# Patient Record
Sex: FEMALE | Race: WHITE | NOT HISPANIC OR LATINO
[De-identification: names, ages, dates, MRNs, and addresses within clinical notes are randomized per-mention and may not be internally consistent; named-entity substitution may affect disease eponyms.]

---

## 2020-11-13 PROBLEM — Z00.00 ENCOUNTER FOR PREVENTIVE HEALTH EXAMINATION: Status: ACTIVE | Noted: 2020-11-13

## 2020-11-16 ENCOUNTER — NON-APPOINTMENT (OUTPATIENT)
Age: 70
End: 2020-11-16

## 2020-11-16 ENCOUNTER — APPOINTMENT (OUTPATIENT)
Dept: OPHTHALMOLOGY | Facility: CLINIC | Age: 70
End: 2020-11-16
Payer: MEDICARE

## 2020-11-16 PROCEDURE — 92002 INTRM OPH EXAM NEW PATIENT: CPT

## 2020-11-16 PROCEDURE — 76513 OPH US DX ANT SGM US UNI/BI: CPT | Mod: LT

## 2021-06-02 ENCOUNTER — APPOINTMENT (OUTPATIENT)
Dept: OPHTHALMOLOGY | Facility: CLINIC | Age: 71
End: 2021-06-02

## 2023-04-05 NOTE — ASU PATIENT PROFILE, ADULT - NS PREOP UNDERSTANDS INFO
no solids after 10pm, clears allowed up to 4am, bring ID and insurance card, no valuables or jewelry, wear comfortable clothing/yes

## 2023-04-05 NOTE — ASU PATIENT PROFILE, ADULT - NSICDXPASTMEDICALHX_GEN_ALL_CORE_FT
PAST MEDICAL HISTORY:  2019 novel coronavirus disease (COVID-19) 12/22    Anxiety and depression     Breast CA left    H/O osteopenia     H/O spinal stenosis cervical    Herpes

## 2023-04-05 NOTE — ASU PATIENT PROFILE, ADULT - NSICDXPASTSURGICALHX_GEN_ALL_CORE_FT
PAST SURGICAL HISTORY:  H/O cervical spine surgery     S/P lumpectomy, left breast     Status post right partial knee replacement

## 2023-04-06 ENCOUNTER — OUTPATIENT (OUTPATIENT)
Dept: OUTPATIENT SERVICES | Facility: HOSPITAL | Age: 73
LOS: 1 days | Discharge: HOME | End: 2023-04-06

## 2023-04-06 VITALS
RESPIRATION RATE: 14 BRPM | HEART RATE: 64 BPM | DIASTOLIC BLOOD PRESSURE: 89 MMHG | WEIGHT: 125 LBS | SYSTOLIC BLOOD PRESSURE: 140 MMHG | TEMPERATURE: 99 F | HEIGHT: 64.75 IN | OXYGEN SATURATION: 97 %

## 2023-04-06 VITALS
OXYGEN SATURATION: 97 % | RESPIRATION RATE: 14 BRPM | DIASTOLIC BLOOD PRESSURE: 93 MMHG | HEART RATE: 70 BPM | SYSTOLIC BLOOD PRESSURE: 142 MMHG

## 2023-04-06 DIAGNOSIS — Z96.651 PRESENCE OF RIGHT ARTIFICIAL KNEE JOINT: Chronic | ICD-10-CM

## 2023-04-06 DIAGNOSIS — Z98.890 OTHER SPECIFIED POSTPROCEDURAL STATES: Chronic | ICD-10-CM

## 2023-04-06 DEVICE — IMPLANTABLE DEVICE
Type: IMPLANTABLE DEVICE | Site: RIGHT | Status: NON-FUNCTIONAL
Removed: 2023-04-06

## 2023-04-06 RX ORDER — SODIUM CHLORIDE 9 MG/ML
1000 INJECTION, SOLUTION INTRAVENOUS
Refills: 0 | Status: DISCONTINUED | OUTPATIENT
Start: 2023-04-06 | End: 2023-04-06

## 2023-04-06 RX ORDER — RALOXIFENE HYDROCHLORIDE 60 MG/1
1 TABLET, COATED ORAL
Refills: 0 | DISCHARGE

## 2023-04-06 RX ORDER — VALACYCLOVIR 500 MG/1
1 TABLET, FILM COATED ORAL
Refills: 0 | DISCHARGE

## 2023-04-06 RX ORDER — GABAPENTIN 400 MG/1
0 CAPSULE ORAL
Refills: 0 | DISCHARGE

## 2023-04-06 RX ORDER — ACETAMINOPHEN 500 MG
650 TABLET ORAL ONCE
Refills: 0 | Status: DISCONTINUED | OUTPATIENT
Start: 2023-04-06 | End: 2023-04-06

## 2023-04-06 RX ORDER — DESLORATADINE 5 MG/1
1 TABLET, FILM COATED ORAL
Refills: 0 | DISCHARGE

## 2023-04-06 RX ORDER — BUPROPION HYDROCHLORIDE 150 MG/1
1 TABLET, EXTENDED RELEASE ORAL
Refills: 0 | DISCHARGE

## 2023-04-06 RX ORDER — TROPICAMIDE 1 %
1 DROPS OPHTHALMIC (EYE)
Refills: 0 | Status: COMPLETED | OUTPATIENT
Start: 2023-04-06 | End: 2023-04-06

## 2023-04-06 RX ORDER — KETOROLAC TROMETHAMINE 0.5 %
1 DROPS OPHTHALMIC (EYE)
Refills: 0 | Status: COMPLETED | OUTPATIENT
Start: 2023-04-06 | End: 2023-04-06

## 2023-04-06 RX ORDER — FINASTERIDE 5 MG/1
1 TABLET, FILM COATED ORAL
Refills: 0 | DISCHARGE

## 2023-04-06 RX ORDER — PHENYLEPHRINE HCL 2.5 %
1 DROPS OPHTHALMIC (EYE)
Refills: 0 | Status: COMPLETED | OUTPATIENT
Start: 2023-04-06 | End: 2023-04-06

## 2023-04-06 RX ORDER — CYCLOPENTOLATE HYDROCHLORIDE 10 MG/ML
1 SOLUTION/ DROPS OPHTHALMIC
Refills: 0 | Status: COMPLETED | OUTPATIENT
Start: 2023-04-06 | End: 2023-04-06

## 2023-04-06 RX ORDER — OFLOXACIN 0.3 %
1 DROPS OPHTHALMIC (EYE)
Refills: 0 | Status: COMPLETED | OUTPATIENT
Start: 2023-04-06 | End: 2023-04-06

## 2023-04-06 RX ADMIN — CYCLOPENTOLATE HYDROCHLORIDE 1 DROP(S): 10 SOLUTION/ DROPS OPHTHALMIC at 06:50

## 2023-04-06 RX ADMIN — Medication 1 DROP(S): at 06:50

## 2023-04-06 RX ADMIN — Medication 1 DROP(S): at 06:40

## 2023-04-06 RX ADMIN — CYCLOPENTOLATE HYDROCHLORIDE 1 DROP(S): 10 SOLUTION/ DROPS OPHTHALMIC at 06:40

## 2023-04-06 RX ADMIN — CYCLOPENTOLATE HYDROCHLORIDE 1 DROP(S): 10 SOLUTION/ DROPS OPHTHALMIC at 06:45

## 2023-04-06 RX ADMIN — Medication 1 DROP(S): at 06:45

## 2023-04-06 NOTE — ASU PREOP CHECKLIST - ALLERGIES REVIEWED
Gaby Pharmacist with [facility] called to discuss depression meds for patient.   done

## 2023-04-07 NOTE — OPERATIVE REPORT - OPERATIVE RPOSRT DETAILS
PROCEDURE DATE: 04-06-23    OPERATION:  Phacoemulsification with lens implant, right eye, plus femtolaser plus ORA, right eye.    PREOPERATIVE DIAGNOSES:  Nuclear sclerotic cataract- RIGHT eye    POSTOPERATIVE DIAGNOSES:  Nuclear sclerotic cataract - RIGHT eye    DESCRIPTION OF PROCEDURE:  After appropriate medical clearance and informed consent was obtained, the patient was brought into the operating room in stable condition.  The patient was in the prone position for femtolaser to the right eye.  Section cup placed on the right eye and docked to the laser.  Capsulorrhexis and nuclear fragmentation was performed.  Section was removed and femto procedure was finished.  After this, the patient was brought into the operating room where MAC anesthesia was induced and the patient was prepped and draped in the usual manner for sterile ophthalmic surgery.  A Bandar speculum was placed into the eye and stabilized with two 4x4s.  Topical lidocaine 4% and Ocucoat viscoelastic was instilled over the cornea.  The surgeon sat temporally.  A paracentesis was made and intraocular lidocaine 1% was instilled into the anterior chamber.  Healon GV was then placed into the anterior chamber.    An anterior rhexis capsulotomy was then performed without complication.  Hydrodissection and hydrodelineation of the lens was then performed using BSS on a flat-tip cannula.  Phacoemulsification of the nucleus was then performed by sculpting the nucleus in half.  Healon GV was then placed into the bag and the cracker was used to split the nucleus in half.  Viscoat was then placed into the crack to enlarge the crack and to shield the endothelium.  Phacoemulsification of the 2 halves was then performed without complication.    Mechanical irrigation and aspiration was then performed to remove any remaining cortical material.  Polishing of the anterior and posterior capsules was then performed.  The bag was then inflated using Healon to separate the anterior and posterior leaflets.  A Barraquer tonometer was then used to determine that intraocular pressure of the eye was somewhere between 15 to 20 mmHg.  The ORA machine was then used to determine the correct intraocular lens implant, which was confirmed at 23.5 diopters for an ZKBOO lens.  The ORA machine was turned off and the Implant number [id number], 23.5 diopter was inserted into the bag and rotated into position.  Irrigation and aspiration of the remaining viscoelastic material was then performed with gentle tapping of the lens to ensure that no viscoelastic material was caught behind the lens.  Miochol was inserted into the eye after inserting into the eye and a round pupil was noted at the end of the case.    Careful attention to ensure that there was no wound leak was performed.  A speculum was removed.  A drop each of TobraDex and Timoptic XE was placed into the eye.  A clear shield was then taped over the eye.  The patient returned to the recovery room in stable and improved condition.

## 2023-04-12 PROBLEM — C50.919 MALIGNANT NEOPLASM OF UNSPECIFIED SITE OF UNSPECIFIED FEMALE BREAST: Chronic | Status: ACTIVE | Noted: 2023-04-05

## 2023-04-12 PROBLEM — F41.9 ANXIETY DISORDER, UNSPECIFIED: Chronic | Status: ACTIVE | Noted: 2023-04-05

## 2023-04-12 PROBLEM — Z87.39 PERSONAL HISTORY OF OTHER DISEASES OF THE MUSCULOSKELETAL SYSTEM AND CONNECTIVE TISSUE: Chronic | Status: ACTIVE | Noted: 2023-04-05

## 2023-04-12 PROBLEM — B00.9 HERPESVIRAL INFECTION, UNSPECIFIED: Chronic | Status: ACTIVE | Noted: 2023-04-05

## 2023-04-12 PROBLEM — U07.1 COVID-19: Chronic | Status: ACTIVE | Noted: 2023-04-05

## 2023-04-19 RX ORDER — SODIUM CHLORIDE 9 MG/ML
1000 INJECTION, SOLUTION INTRAVENOUS
Refills: 0 | Status: DISCONTINUED | OUTPATIENT
Start: 2023-04-20 | End: 2023-04-20

## 2023-04-19 NOTE — ASU PATIENT PROFILE, ADULT - NSICDXPASTSURGICALHX_GEN_ALL_CORE_FT
PAST SURGICAL HISTORY:  H/O cervical spine surgery     S/P cataract surgery right eye    S/P lumpectomy, left breast     Status post right partial knee replacement

## 2023-04-20 ENCOUNTER — TRANSCRIPTION ENCOUNTER (OUTPATIENT)
Age: 73
End: 2023-04-20

## 2023-04-20 ENCOUNTER — OUTPATIENT (OUTPATIENT)
Dept: OUTPATIENT SERVICES | Facility: HOSPITAL | Age: 73
LOS: 1 days | Discharge: HOME | End: 2023-04-20

## 2023-04-20 VITALS
HEIGHT: 65 IN | WEIGHT: 127.43 LBS | OXYGEN SATURATION: 98 % | SYSTOLIC BLOOD PRESSURE: 144 MMHG | DIASTOLIC BLOOD PRESSURE: 84 MMHG | TEMPERATURE: 98 F | RESPIRATION RATE: 15 BRPM | HEART RATE: 61 BPM

## 2023-04-20 VITALS
HEART RATE: 64 BPM | OXYGEN SATURATION: 99 % | TEMPERATURE: 97 F | SYSTOLIC BLOOD PRESSURE: 158 MMHG | RESPIRATION RATE: 16 BRPM | DIASTOLIC BLOOD PRESSURE: 82 MMHG

## 2023-04-20 DIAGNOSIS — Z98.890 OTHER SPECIFIED POSTPROCEDURAL STATES: Chronic | ICD-10-CM

## 2023-04-20 DIAGNOSIS — Z96.651 PRESENCE OF RIGHT ARTIFICIAL KNEE JOINT: Chronic | ICD-10-CM

## 2023-04-20 DIAGNOSIS — Z98.49 CATARACT EXTRACTION STATUS, UNSPECIFIED EYE: Chronic | ICD-10-CM

## 2023-04-20 DEVICE — IMPLANTABLE DEVICE
Type: IMPLANTABLE DEVICE | Site: LEFT | Status: NON-FUNCTIONAL
Removed: 2023-04-20

## 2023-04-20 RX ORDER — TROPICAMIDE 1 %
1 DROPS OPHTHALMIC (EYE)
Refills: 0 | Status: COMPLETED | OUTPATIENT
Start: 2023-04-20 | End: 2023-04-20

## 2023-04-20 RX ORDER — OFLOXACIN 0.3 %
1 DROPS OPHTHALMIC (EYE)
Refills: 0 | Status: COMPLETED | OUTPATIENT
Start: 2023-04-20 | End: 2023-04-20

## 2023-04-20 RX ORDER — KETOROLAC TROMETHAMINE 0.5 %
1 DROPS OPHTHALMIC (EYE)
Refills: 0 | Status: COMPLETED | OUTPATIENT
Start: 2023-04-20 | End: 2023-04-20

## 2023-04-20 RX ORDER — ONDANSETRON 8 MG/1
4 TABLET, FILM COATED ORAL ONCE
Refills: 0 | Status: DISCONTINUED | OUTPATIENT
Start: 2023-04-20 | End: 2023-04-20

## 2023-04-20 RX ORDER — ACETAMINOPHEN 500 MG
650 TABLET ORAL ONCE
Refills: 0 | Status: DISCONTINUED | OUTPATIENT
Start: 2023-04-20 | End: 2023-04-20

## 2023-04-20 RX ORDER — CYCLOPENTOLATE HYDROCHLORIDE 10 MG/ML
1 SOLUTION/ DROPS OPHTHALMIC
Refills: 0 | Status: COMPLETED | OUTPATIENT
Start: 2023-04-20 | End: 2023-04-20

## 2023-04-20 RX ORDER — PHENYLEPHRINE HCL 2.5 %
1 DROPS OPHTHALMIC (EYE)
Refills: 0 | Status: COMPLETED | OUTPATIENT
Start: 2023-04-20 | End: 2023-04-20

## 2023-04-20 RX ADMIN — Medication 1 DROP(S): at 07:09

## 2023-04-20 RX ADMIN — Medication 1 DROP(S): at 06:59

## 2023-04-20 RX ADMIN — Medication 1 DROP(S): at 06:54

## 2023-04-20 RX ADMIN — Medication 1 DROP(S): at 06:53

## 2023-04-20 RX ADMIN — Medication 1 DROP(S): at 07:10

## 2023-04-20 RX ADMIN — CYCLOPENTOLATE HYDROCHLORIDE 1 DROP(S): 10 SOLUTION/ DROPS OPHTHALMIC at 06:53

## 2023-04-20 RX ADMIN — CYCLOPENTOLATE HYDROCHLORIDE 1 DROP(S): 10 SOLUTION/ DROPS OPHTHALMIC at 06:59

## 2023-04-20 RX ADMIN — Medication 1 DROP(S): at 06:58

## 2023-04-20 RX ADMIN — CYCLOPENTOLATE HYDROCHLORIDE 1 DROP(S): 10 SOLUTION/ DROPS OPHTHALMIC at 07:09

## 2023-04-20 RX ADMIN — Medication 1 DROP(S): at 07:11

## 2023-04-20 NOTE — ASU DISCHARGE PLAN (ADULT/PEDIATRIC) - NS MD DC FALL RISK RISK
For information on Fall & Injury Prevention, visit: [email or web address] OR  [email or web address] OR  https://www.cdc.gov/steadi/patient.html

## 2023-04-20 NOTE — PRE-ANESTHESIA EVALUATION ADULT - NSANTHNECKRD_ENT_A_CORE
Labs return  K 3 0  LDH elevated ( maybe due to recent surgery) but uric acid nml plts nml lft and cr nml  No hx of vomiting or diarrhea  CMP was drawn on preop blood  BP returning to systolics of 757  She denies preeclamptic signs  Her exam shows her heart is RRR without murmurs, lungs CTA, Abdomen soft and nontender  Extremities with no edema and nml reflexes and no clonus  Refuses to stay  Agreeable to redraw of K level and taking an oral dose of procardia 30 xl  If BP controlled and < 160/110 she wants to go home and monitor her bp on her own  Reviewed risks of uncontrolled preeclampsia with development of ecclampsia and stroke or HELLP  Patient sure this is white coat hypertension  States staying here is causing more htn she feels  Roger Grijalva MD     The majority of time (greater then 50%) was spent on counseling and coordination of care of this patient and /or family member  Approximate face to face time was 15 minutes  No

## 2023-04-21 NOTE — OPERATIVE REPORT - OPERATIVE RPOSRT DETAILS
PROCEDURE DATE: 04-20-23    OPERATION:  Phacoemulsification with lens implant, LEFT eye, plus femtolaser plus ORA Nash.    PREOPERATIVE DIAGNOSES:  Nuclear sclerotic cataract- LEFT eye    POSTOPERATIVE DIAGNOSES:  Nuclear sclerotic cataract - LEFT eye    DESCRIPTION OF PROCEDURE:  After appropriate medical clearance and informed consent was obtained, the patient was brought into the operating room in stable condition.  The patient was in the prone position for femtolaser to the left eye.  Section cup placed on the left eye and docked to the laser.  Capsulorrhexis and nuclear fragmentation was performed.  Section was removed and femto procedure was finished.  After this, the patient was brought into the operating room where MAC anesthesia was induced and the patient was prepped and draped in the usual manner for sterile ophthalmic surgery.  A Bandar speculum was placed into the eye and stabilized with two 4x4s.  Topical lidocaine 4% and Ocucoat viscoelastic was instilled over the cornea.  The surgeon sat temporally.  A paracentesis was made and intraocular lidocaine 1% was instilled into the anterior chamber.  Healon GV was then placed into the anterior chamber.    An anterior rhexis capsulotomy was then performed without complication.  Hydrodissection and hydrodelineation of the lens was then performed using BSS on a flat-tip cannula.  Phacoemulsification of the nucleus was then performed by sculpting the nucleus in half.  Healon GV was then placed into the bag and the cracker was used to split the nucleus in half.  Viscoat was then placed into the crack to enlarge the crack and to shield the endothelium.  Phacoemulsification of the 2 halves was then performed without complication.    Mechanical irrigation and aspiration was then performed to remove any remaining cortical material.  Polishing of the anterior and posterior capsules was then performed.  The bag was then inflated using Healon to separate the anterior and posterior leaflets.  A Barraquer tonometer was then used to determine that intraocular pressure of the eye was somewhere between 15 to 20 mmHg.  The ORA machine was then used to determine the correct intraocular lens implant, which was confirmed at 23.0 diopters for an ZKBOO lens.  The ORA machine was turned off and the Implant number [id number], 23.0 diopter was inserted into the bag and rotated into position.  Irrigation and aspiration of the remaining viscoelastic material was then performed with gentle tapping of the lens to ensure that no viscoelastic material was caught behind the lens.  Miochol was inserted into the eye after inserting into the eye and a round pupil was noted at the end of the case.    Careful attention to ensure that there was no wound leak was performed.  A speculum was removed.  A drop each of TobraDex and Timoptic XE was placed into the eye.  A clear shield was then taped over the eye.  The patient returned to the recovery room in stable and improved condition.

## 2025-06-05 ENCOUNTER — NON-APPOINTMENT (OUTPATIENT)
Age: 75
End: 2025-06-05

## 2025-06-10 ENCOUNTER — NON-APPOINTMENT (OUTPATIENT)
Age: 75
End: 2025-06-10

## 2025-06-10 ENCOUNTER — APPOINTMENT (OUTPATIENT)
Dept: ORTHOPEDIC SURGERY | Facility: CLINIC | Age: 75
End: 2025-06-10
Payer: MEDICARE

## 2025-06-10 VITALS — BODY MASS INDEX: 21.51 KG/M2 | HEIGHT: 64 IN | RESPIRATION RATE: 16 BRPM | WEIGHT: 126 LBS

## 2025-06-10 PROBLEM — M25.532 LEFT WRIST PAIN: Status: ACTIVE | Noted: 2025-06-10

## 2025-06-10 PROBLEM — C80.1 CANCER: Status: RESOLVED | Noted: 2025-06-10 | Resolved: 2025-06-10

## 2025-06-10 PROBLEM — Z78.9 CONSUMES ALCOHOL OCCASIONALLY: Status: ACTIVE | Noted: 2025-06-10

## 2025-06-10 PROCEDURE — 99204 OFFICE O/P NEW MOD 45 MIN: CPT

## 2025-06-10 PROCEDURE — 73110 X-RAY EXAM OF WRIST: CPT | Mod: 50

## 2025-06-10 RX ORDER — ROSUVASTATIN CALCIUM 5 MG/1
TABLET, FILM COATED ORAL
Refills: 0 | Status: ACTIVE | COMMUNITY

## 2025-06-10 RX ORDER — APIXABAN 5 MG/1
TABLET, FILM COATED ORAL
Refills: 0 | Status: ACTIVE | COMMUNITY

## 2025-06-10 RX ORDER — METOPROLOL TARTRATE 75 MG/1
TABLET, FILM COATED ORAL
Refills: 0 | Status: ACTIVE | COMMUNITY

## 2025-08-18 ENCOUNTER — NON-APPOINTMENT (OUTPATIENT)
Age: 75
End: 2025-08-18

## 2025-09-10 RX ORDER — ACETAMINOPHEN AND CODEINE PHOSPHATE 300; 30 MG/1; MG/1
300-30 TABLET ORAL
Qty: 12 | Refills: 0 | Status: ACTIVE | COMMUNITY
Start: 2025-09-10 | End: 1900-01-01

## 2025-09-11 ENCOUNTER — TRANSCRIPTION ENCOUNTER (OUTPATIENT)
Age: 75
End: 2025-09-11

## 2025-09-11 ENCOUNTER — OUTPATIENT (OUTPATIENT)
Dept: OUTPATIENT SERVICES | Facility: HOSPITAL | Age: 75
LOS: 1 days | Discharge: HOME | End: 2025-09-11
Payer: MEDICARE

## 2025-09-11 ENCOUNTER — APPOINTMENT (OUTPATIENT)
Dept: ORTHOPEDIC SURGERY | Facility: AMBULATORY SURGERY CENTER | Age: 75
End: 2025-09-11
Payer: MEDICARE

## 2025-09-11 ENCOUNTER — RESULT REVIEW (OUTPATIENT)
Age: 75
End: 2025-09-11

## 2025-09-11 VITALS
HEIGHT: 64 IN | TEMPERATURE: 99 F | DIASTOLIC BLOOD PRESSURE: 89 MMHG | SYSTOLIC BLOOD PRESSURE: 155 MMHG | RESPIRATION RATE: 15 BRPM | OXYGEN SATURATION: 95 % | HEART RATE: 66 BPM | WEIGHT: 113.1 LBS

## 2025-09-11 VITALS
TEMPERATURE: 97 F | DIASTOLIC BLOOD PRESSURE: 82 MMHG | OXYGEN SATURATION: 98 % | RESPIRATION RATE: 18 BRPM | HEART RATE: 61 BPM | SYSTOLIC BLOOD PRESSURE: 136 MMHG

## 2025-09-11 DIAGNOSIS — Z96.651 PRESENCE OF RIGHT ARTIFICIAL KNEE JOINT: Chronic | ICD-10-CM

## 2025-09-11 DIAGNOSIS — Z98.890 OTHER SPECIFIED POSTPROCEDURAL STATES: Chronic | ICD-10-CM

## 2025-09-11 DIAGNOSIS — Z98.49 CATARACT EXTRACTION STATUS, UNSPECIFIED EYE: Chronic | ICD-10-CM

## 2025-09-11 PROCEDURE — 88304 TISSUE EXAM BY PATHOLOGIST: CPT | Mod: 26

## 2025-09-11 PROCEDURE — 25000 INCISION OF TENDON SHEATH: CPT | Mod: LT

## 2025-09-11 PROCEDURE — 29848 WRIST ENDOSCOPY/SURGERY: CPT | Mod: LT

## 2025-09-11 RX ORDER — METOPROLOL SUCCINATE 50 MG/1
20 TABLET, EXTENDED RELEASE ORAL
Refills: 0 | DISCHARGE

## 2025-09-11 RX ORDER — ROSUVASTATIN CALCIUM 20 MG/1
1 TABLET, FILM COATED ORAL
Refills: 0 | DISCHARGE

## 2025-09-12 PROBLEM — C44.90 UNSPECIFIED MALIGNANT NEOPLASM OF SKIN, UNSPECIFIED: Chronic | Status: ACTIVE | Noted: 2025-09-11

## 2025-09-19 ENCOUNTER — APPOINTMENT (OUTPATIENT)
Dept: ORTHOPEDIC SURGERY | Facility: CLINIC | Age: 75
End: 2025-09-19
Payer: MEDICARE

## 2025-09-19 DIAGNOSIS — M25.532 PAIN IN LEFT WRIST: ICD-10-CM

## 2025-09-19 PROCEDURE — 99024 POSTOP FOLLOW-UP VISIT: CPT

## (undated) DEVICE — MARKING PEN W RULER

## (undated) DEVICE — SUT ETHILON 5-0 18" P-3

## (undated) DEVICE — WARMING BLANKET LOWER ADULT

## (undated) DEVICE — POSITIONER OA ARM ELEVATOR DISP

## (undated) DEVICE — SUT MONOCRYL 5-0 18" P-3 UNDYED

## (undated) DEVICE — TRANSFORMER INTREPID I/A 0.3MM

## (undated) DEVICE — Device

## (undated) DEVICE — DRAPE MICROSCOPE KNOB COVER SMALL (2 PCS)

## (undated) DEVICE — APPLICATOR COTTON TIP 6"

## (undated) DEVICE — PACK CENTURION FMS ACT.9 30 BAL

## (undated) DEVICE — NUCLEUS HYDRODISSECTOR PEARCE ANGLED 25G X 22MM

## (undated) DEVICE — SOL ANTI FOG (FRED)

## (undated) DEVICE — SYR LUER LOK 1CC

## (undated) DEVICE — PACK CENTURION 2.4MM

## (undated) DEVICE — SOL IRR BAL SALT 500ML

## (undated) DEVICE — GLV 6.5 PROTEXIS (WHITE)

## (undated) DEVICE — KNIFE ALCON STANDARD FULL HANDLE 15 DEG (PINK)

## (undated) DEVICE — STRATOS ENDOSCOPIC CARPAL TUNNEL RELEASE SYSTEM

## (undated) DEVICE — TOURNIQUET CUFF 24" DUAL PORT SINGLE BLADDER W PLC (BLACK)

## (undated) DEVICE — PREP BETADINE KIT

## (undated) DEVICE — DRSG STERISTRIPS 0.5 X 4"

## (undated) DEVICE — PACK HAND

## (undated) DEVICE — TOURNIQUET CUFF 18" DUAL PORT SINGLE BLADDER W PLC  (BLACK)

## (undated) DEVICE — KNIFE ALCON SLIT INTREPID CLEAR-CUT SAFETY 2.4MM

## (undated) DEVICE — VENODYNE/SCD SLEEVE CALF MEDIUM